# Patient Record
Sex: MALE | Race: OTHER | HISPANIC OR LATINO | Employment: FULL TIME | URBAN - METROPOLITAN AREA
[De-identification: names, ages, dates, MRNs, and addresses within clinical notes are randomized per-mention and may not be internally consistent; named-entity substitution may affect disease eponyms.]

---

## 2021-06-02 ENCOUNTER — OFFICE VISIT (OUTPATIENT)
Dept: FAMILY MEDICINE CLINIC | Facility: CLINIC | Age: 56
End: 2021-06-02
Payer: COMMERCIAL

## 2021-06-02 VITALS
BODY MASS INDEX: 27.16 KG/M2 | SYSTOLIC BLOOD PRESSURE: 122 MMHG | HEIGHT: 66 IN | RESPIRATION RATE: 20 BRPM | DIASTOLIC BLOOD PRESSURE: 72 MMHG | OXYGEN SATURATION: 99 % | WEIGHT: 169 LBS | HEART RATE: 68 BPM | TEMPERATURE: 97.8 F

## 2021-06-02 DIAGNOSIS — R53.83 OTHER FATIGUE: ICD-10-CM

## 2021-06-02 DIAGNOSIS — Z12.11 SCREENING FOR COLON CANCER: ICD-10-CM

## 2021-06-02 DIAGNOSIS — R73.9 HYPERGLYCEMIA: ICD-10-CM

## 2021-06-02 DIAGNOSIS — L72.3 SEBACEOUS CYST: Primary | ICD-10-CM

## 2021-06-02 DIAGNOSIS — E78.2 MIXED HYPERLIPIDEMIA: ICD-10-CM

## 2021-06-02 PROCEDURE — 99204 OFFICE O/P NEW MOD 45 MIN: CPT | Performed by: NURSE PRACTITIONER

## 2021-06-02 NOTE — PROGRESS NOTES
BMI Counseling: Body mass index is 27 28 kg/m²  The BMI is above normal  Nutrition recommendations include encouraging healthy choices of fruits and vegetables, decreasing fast food intake, consuming healthier snacks, limiting drinks that contain sugar, increasing intake of lean protein, reducing intake of saturated and trans fat and reducing intake of cholesterol  Exercise recommendations include exercising 3-5 times per week  Depression Screening and Follow-up Plan: Patient's depression screening was positive with a PHQ-2 score of 0  Clincally patient does not have depression  No treatment is required  Assessment/Plan:    Sebaceous cyst  -pt had this removed a few years ago and it has now returned  Does not hurt or bother him but he knows its there and growing  Today we are giving him a referral to see general surgery for possible removal        Diagnoses and all orders for this visit:    Sebaceous cyst  -     Ambulatory referral to General Surgery; Future    Screening for colon cancer  -     Occult Blood, Fecal Immunochemical; Future    Other fatigue  -     CBC and differential; Future  -     TSH, 3rd generation with Free T4 reflex; Future    Hyperglycemia  -     Comprehensive metabolic panel; Future  -     Hemoglobin A1C; Future    Mixed hyperlipidemia  -     Lipid panel; Future    Other orders  -     Cancel: HIV 1/2 Antigen/Antibody (4th Generation) w Reflex [facility]; Future  -     Cancel: Ambulatory referral to Gastroenterology; Future          Subjective:      Patient ID: Manny Diaz is a 55 y o  male  55year old male patient here to establish care  Was receiving care across the street  PMHx: no significant past medical history  States he had it removed a few years ago and this sebaceous cyst returned per patient  Denies any pain but knows its there  Rash  This is a recurrent problem  The current episode started more than 1 year ago  The problem has been gradually worsening since onset  The affected locations include the scalp (occipital)  The rash is characterized by swelling (lipoma like)  It is unknown if there was an exposure to a precipitant  Pertinent negatives include no congestion, cough, eye pain, fatigue, fever, rhinorrhea, shortness of breath, sore throat or vomiting  Past treatments include nothing  The treatment provided no relief  There is no history of allergies, asthma, eczema or varicella  The following portions of the patient's history were reviewed and updated as appropriate: allergies, current medications, past family history, past medical history, past social history, past surgical history and problem list     Review of Systems   Constitutional: Negative  Negative for appetite change, fatigue and fever  HENT: Negative  Negative for congestion, ear pain, postnasal drip, rhinorrhea, sore throat and voice change  Eyes: Negative  Negative for pain  Respiratory: Negative  Negative for cough, chest tightness, shortness of breath and wheezing  Cardiovascular: Negative  Negative for chest pain and palpitations  Gastrointestinal: Negative  Negative for abdominal distention and vomiting  Endocrine: Negative  Genitourinary: Negative  Negative for difficulty urinating  Musculoskeletal: Negative  Negative for arthralgias  Skin: Positive for rash  Negative for color change  Allergic/Immunologic: Negative  Neurological: Negative  Negative for dizziness and headaches  Hematological: Negative  Does not bruise/bleed easily  Psychiatric/Behavioral: Negative  Objective:      /72 (BP Location: Left arm, Patient Position: Sitting, Cuff Size: Standard)   Pulse 68   Temp 97 8 °F (36 6 °C) (Temporal)   Resp 20   Ht 5' 6" (1 676 m)   Wt 76 7 kg (169 lb)   SpO2 99%   BMI 27 28 kg/m²          Physical Exam  Vitals signs and nursing note reviewed  Constitutional:       General: He is not in acute distress       Appearance: Normal appearance  He is not ill-appearing  HENT:      Head: Normocephalic and atraumatic  Right Ear: External ear normal       Left Ear: External ear normal       Nose: Nose normal       Mouth/Throat:      Pharynx: Oropharynx is clear  No oropharyngeal exudate  Eyes:      Conjunctiva/sclera: Conjunctivae normal    Neck:      Musculoskeletal: Normal range of motion and neck supple  Cardiovascular:      Rate and Rhythm: Normal rate and regular rhythm  Pulses: Normal pulses  Heart sounds: Normal heart sounds  Pulmonary:      Effort: Pulmonary effort is normal  No respiratory distress  Breath sounds: Normal breath sounds  Musculoskeletal: Normal range of motion  Skin:     General: Skin is warm and dry  Capillary Refill: Capillary refill takes less than 2 seconds  Findings: Lesion and rash present  Rash is nodular  Comments: Cyst like lesion at occiput of scalp area  Non-tender to palpation  At site of previous lipoma  Neurological:      General: No focal deficit present  Mental Status: He is alert and oriented to person, place, and time     Psychiatric:         Mood and Affect: Mood normal          Behavior: Behavior normal              Chief Complaint   Patient presents with   [facility]

## 2021-06-02 NOTE — ASSESSMENT & PLAN NOTE
-pt had this removed a few years ago and it has now returned  Does not hurt or bother him but he knows its there and growing   Today we are giving him a referral to see general surgery for possible removal

## 2021-06-08 ENCOUNTER — APPOINTMENT (OUTPATIENT)
Dept: LAB | Facility: HOSPITAL | Age: 56
End: 2021-06-08
Payer: COMMERCIAL

## 2021-06-08 DIAGNOSIS — Z12.11 SCREENING FOR COLON CANCER: ICD-10-CM

## 2021-06-08 LAB — HEMOCCULT STL QL IA: NEGATIVE

## 2021-06-08 PROCEDURE — G0328 FECAL BLOOD SCRN IMMUNOASSAY: HCPCS

## 2021-07-07 ENCOUNTER — OFFICE VISIT (OUTPATIENT)
Dept: FAMILY MEDICINE CLINIC | Facility: CLINIC | Age: 56
End: 2021-07-07
Payer: COMMERCIAL

## 2021-07-07 ENCOUNTER — APPOINTMENT (OUTPATIENT)
Dept: LAB | Facility: HOSPITAL | Age: 56
End: 2021-07-07
Payer: COMMERCIAL

## 2021-07-07 VITALS
OXYGEN SATURATION: 99 % | RESPIRATION RATE: 20 BRPM | BODY MASS INDEX: 27.32 KG/M2 | WEIGHT: 170 LBS | HEART RATE: 76 BPM | SYSTOLIC BLOOD PRESSURE: 115 MMHG | TEMPERATURE: 97.3 F | HEIGHT: 66 IN | DIASTOLIC BLOOD PRESSURE: 70 MMHG

## 2021-07-07 DIAGNOSIS — Z12.5 SCREENING FOR PROSTATE CANCER: ICD-10-CM

## 2021-07-07 DIAGNOSIS — Z01.00 ROUTINE EYE EXAM: ICD-10-CM

## 2021-07-07 DIAGNOSIS — E78.2 MIXED HYPERLIPIDEMIA: ICD-10-CM

## 2021-07-07 DIAGNOSIS — Z00.00 ANNUAL PHYSICAL EXAM: Primary | ICD-10-CM

## 2021-07-07 DIAGNOSIS — R53.83 OTHER FATIGUE: ICD-10-CM

## 2021-07-07 DIAGNOSIS — R73.9 HYPERGLYCEMIA: ICD-10-CM

## 2021-07-07 DIAGNOSIS — L72.3 SEBACEOUS CYST: ICD-10-CM

## 2021-07-07 LAB
ALBUMIN SERPL BCP-MCNC: 3.8 G/DL (ref 3–5.2)
ALP SERPL-CCNC: 100 U/L (ref 43–122)
ALT SERPL W P-5'-P-CCNC: 31 U/L
ANION GAP SERPL CALCULATED.3IONS-SCNC: 8 MMOL/L (ref 5–14)
AST SERPL W P-5'-P-CCNC: 37 U/L (ref 17–59)
BILIRUB SERPL-MCNC: 0.59 MG/DL
BUN SERPL-MCNC: 13 MG/DL (ref 5–25)
CALCIUM SERPL-MCNC: 9 MG/DL (ref 8.4–10.2)
CHLORIDE SERPL-SCNC: 107 MMOL/L (ref 97–108)
CHOLEST SERPL-MCNC: 217 MG/DL
CO2 SERPL-SCNC: 25 MMOL/L (ref 22–30)
CREAT SERPL-MCNC: 0.78 MG/DL (ref 0.7–1.5)
EOSINOPHIL # BLD AUTO: 0.34 THOUSAND/UL (ref 0–0.4)
EOSINOPHIL NFR BLD MANUAL: 8 % (ref 0–6)
ERYTHROCYTE [DISTWIDTH] IN BLOOD BY AUTOMATED COUNT: 13.8 %
EST. AVERAGE GLUCOSE BLD GHB EST-MCNC: 120 MG/DL
GFR SERPL CREATININE-BSD FRML MDRD: 102 ML/MIN/1.73SQ M
GLUCOSE P FAST SERPL-MCNC: 99 MG/DL (ref 70–99)
HBA1C MFR BLD: 5.8 %
HCT VFR BLD AUTO: 40.9 % (ref 41–53)
HDLC SERPL-MCNC: 46 MG/DL
HGB BLD-MCNC: 13.5 G/DL (ref 13.5–17.5)
LDLC SERPL CALC-MCNC: 148 MG/DL
LYMPHOCYTES # BLD AUTO: 1.76 THOUSAND/UL (ref 0.5–4)
LYMPHOCYTES # BLD AUTO: 41 % (ref 25–45)
MCH RBC QN AUTO: 29.6 PG (ref 26–34)
MCHC RBC AUTO-ENTMCNC: 32.9 G/DL (ref 31–36)
MCV RBC AUTO: 90 FL (ref 80–100)
MONOCYTES # BLD AUTO: 0.43 THOUSAND/UL (ref 0.2–0.9)
MONOCYTES NFR BLD AUTO: 10 % (ref 1–10)
NEUTS SEG # BLD: 1.76 THOUSAND/UL (ref 1.8–7.8)
NEUTS SEG NFR BLD AUTO: 41 %
NONHDLC SERPL-MCNC: 171 MG/DL
PLATELET # BLD AUTO: 299 THOUSANDS/UL (ref 150–450)
PLATELET BLD QL SMEAR: ADEQUATE
PMV BLD AUTO: 8 FL (ref 8.9–12.7)
POTASSIUM SERPL-SCNC: 4 MMOL/L (ref 3.6–5)
PROT SERPL-MCNC: 6.8 G/DL (ref 5.9–8.4)
RBC # BLD AUTO: 4.54 MILLION/UL (ref 4.5–5.9)
RBC MORPH BLD: NORMAL
SODIUM SERPL-SCNC: 140 MMOL/L (ref 137–147)
TOTAL CELLS COUNTED SPEC: 100
TRIGL SERPL-MCNC: 116 MG/DL
TSH SERPL DL<=0.05 MIU/L-ACNC: 1.25 UIU/ML (ref 0.47–4.68)
WBC # BLD AUTO: 4.3 THOUSAND/UL (ref 4.5–11)

## 2021-07-07 PROCEDURE — 36415 COLL VENOUS BLD VENIPUNCTURE: CPT

## 2021-07-07 PROCEDURE — 85007 BL SMEAR W/DIFF WBC COUNT: CPT

## 2021-07-07 PROCEDURE — 80061 LIPID PANEL: CPT

## 2021-07-07 PROCEDURE — 84443 ASSAY THYROID STIM HORMONE: CPT

## 2021-07-07 PROCEDURE — 99396 PREV VISIT EST AGE 40-64: CPT | Performed by: NURSE PRACTITIONER

## 2021-07-07 PROCEDURE — 80053 COMPREHEN METABOLIC PANEL: CPT

## 2021-07-07 PROCEDURE — 83036 HEMOGLOBIN GLYCOSYLATED A1C: CPT

## 2021-07-07 PROCEDURE — 85027 COMPLETE CBC AUTOMATED: CPT

## 2021-07-07 NOTE — PROGRESS NOTES
[address] PRIMARY CARE [facility]    NAME: Mohsen Cota  AGE: 55 y o  SEX: male  : 1965     DATE: 2021     Assessment and Plan:     Problem List Items Addressed This Visit        Musculoskeletal and Integument    Sebaceous cyst    Relevant Orders    Ambulatory referral to General Surgery       Other    Annual physical exam - Primary     Patient recommended healthy eating habits  Health risk assessment was discussed with patient also and the ways to stay healthier  Recommended a exercising frequently at least 5 days a week for 30 minutes at a time; such as joining a gym if not already enrolled or walking  Eating low-fat and low-cholesterol foods with avoidance of saturated fats or fried foods  Immunizations, and the need to comply with current CDC's recommendations were discussed  To follow up yearly for physical exams  Routine eye exam    Relevant Orders    Ambulatory referral to Ophthalmology    Screening for prostate cancer    Relevant Orders    PSA, Total Screen          Immunizations and preventive care screenings were discussed with patient today  Appropriate education was printed on patient's after visit summary  Counseling:  Alcohol/drug use: discussed moderation in alcohol intake, the recommendations for healthy alcohol use, and avoidance of illicit drug use  Dental Health: discussed importance of regular tooth brushing, flossing, and dental visits  Injury prevention: discussed safety/seat belts, safety helmets, smoke detectors, carbon dioxide detectors, and smoking near bedding or upholstery  Sexual health: discussed sexually transmitted diseases, partner selection, use of condoms, avoidance of unintended pregnancy, and contraceptive alternatives  · Exercise: the importance of regular exercise/physical activity was discussed  Recommend exercise 3-5 times per week for at least 30 minutes       BMI Counseling: Body mass index is 27 44 kg/m²  The BMI is above normal  Nutrition recommendations include encouraging healthy choices of fruits and vegetables, decreasing fast food intake, consuming healthier snacks, limiting drinks that contain sugar, increasing intake of lean protein, reducing intake of saturated and trans fat and reducing intake of cholesterol  Exercise recommendations include exercising 3-5 times per week  Depression Screening and Follow-up Plan: Patient's depression screening was positive with a PHQ-2 score of 0  Clincally patient does not have depression  No treatment is required  Return in about 1 year (around 7/7/2022) for Annual physical      Chief Complaint:     Chief Complaint   Patient presents with    Physical Exam      History of Present Illness:     Adult Annual Physical   Patient here for a comprehensive physical exam  The patient reports no problems  Diet and Physical Activity  · Diet/Nutrition: limited junk food, limited fruits/vegetables and at times bread and carbs  · Exercise: walking  Depression Screening  PHQ-9 Depression Screening    PHQ-9:   Frequency of the following problems over the past two weeks:      Little interest or pleasure in doing things: 0 - not at all  Feeling down, depressed, or hopeless: 0 - not at all  PHQ-2 Score: 0       General Health  · Sleep: gets 7-8 hours of sleep on average  · Hearing: normal - bilateral   · Vision: most recent eye exam >1 year ago  · Dental: regular dental visits and brushes teeth once daily   Health  · Symptoms include: none     Review of Systems:     Review of Systems   Constitutional: Negative  Negative for appetite change, fatigue and fever  HENT: Negative  Negative for congestion, ear pain, postnasal drip, rhinorrhea, sore throat and voice change  Eyes: Negative  Respiratory: Negative  Negative for cough, chest tightness, shortness of breath and wheezing  Cardiovascular: Negative  Negative for chest pain and palpitations  Gastrointestinal: Negative  Negative for abdominal distention  Endocrine: Negative  Genitourinary: Negative  Negative for difficulty urinating  Musculoskeletal: Negative  Negative for arthralgias  Skin: Negative  Negative for color change and rash  Allergic/Immunologic: Negative  Neurological: Negative  Negative for dizziness and headaches  Hematological: Negative  Does not bruise/bleed easily  Psychiatric/Behavioral: Negative  Past Medical History:     History reviewed  No pertinent past medical history  Past Surgical History:     Past Surgical History:   Procedure Laterality Date    CYST REMOVAL        Family History:     Family History   Problem Relation Age of Onset    Diabetes Father       Social History:     Social History     Socioeconomic History    Marital status: /Civil Union     Spouse name: None    Number of children: None    Years of education: None    Highest education level: None   Occupational History    None   Tobacco Use    Smoking status: Never Smoker    Smokeless tobacco: Never Used   Vaping Use    Vaping Use: Never used   Substance and Sexual Activity    Alcohol use: Not Currently    Drug use: Never    Sexual activity: Yes     Partners: Female     Birth control/protection: None   Other Topics Concern    None   Social History Narrative    None     Social Determinants of Health     Financial Resource Strain:     Difficulty of Paying Living Expenses:    Food Insecurity:     Worried About Running Out of Food in the Last Year:     Ran Out of Food in the Last Year:    Transportation Needs:     Lack of Transportation (Medical):      Lack of Transportation (Non-Medical):    Physical Activity:     Days of Exercise per Week:     Minutes of Exercise per Session:    Stress:     Feeling of Stress :    Social Connections:     Frequency of Communication with Friends and Family:     Frequency of Social Gatherings with Friends and Family:     Attends Zoroastrianism Services:     Active Member of Clubs or Organizations:     Attends Club or Organization Meetings:     Marital Status:    Intimate Partner Violence:     Fear of Current or Ex-Partner:     Emotionally Abused:     Physically Abused:     Sexually Abused:       Current Medications:     No current outpatient medications on file  No current facility-administered medications for this visit  Allergies:     No Known Allergies   Physical Exam:     /70 (BP Location: Left arm, Patient Position: Sitting, Cuff Size: Standard)   Pulse 76   Temp (!) 97 3 °F (36 3 °C) (Temporal)   Resp 20   Ht 5' 6" (1 676 m)   Wt 77 1 kg (170 lb)   SpO2 99%   BMI 27 44 kg/m²     Physical Exam  Vitals and nursing note reviewed  Constitutional:       General: He is not in acute distress  Appearance: Normal appearance  He is not ill-appearing, toxic-appearing or diaphoretic  HENT:      Head: Normocephalic and atraumatic  Right Ear: Tympanic membrane, ear canal and external ear normal  There is no impacted cerumen  Left Ear: Tympanic membrane, ear canal and external ear normal  There is no impacted cerumen  Nose: Nose normal  No congestion or rhinorrhea  Mouth/Throat:      Mouth: Mucous membranes are moist       Pharynx: Oropharynx is clear  No oropharyngeal exudate or posterior oropharyngeal erythema  Eyes:      Extraocular Movements: Extraocular movements intact  Conjunctiva/sclera: Conjunctivae normal       Pupils: Pupils are equal, round, and reactive to light  Cardiovascular:      Rate and Rhythm: Normal rate and regular rhythm  Pulses: Normal pulses  Heart sounds: Normal heart sounds  Pulmonary:      Effort: Pulmonary effort is normal       Breath sounds: Normal breath sounds  Abdominal:      General: Bowel sounds are normal       Palpations: Abdomen is soft     Genitourinary:     Comments: Pt deferred  Musculoskeletal:         General: No tenderness or deformity  Normal range of motion  Cervical back: Normal range of motion and neck supple  Skin:     General: Skin is warm and dry  Capillary Refill: Capillary refill takes less than 2 seconds  Neurological:      General: No focal deficit present  Mental Status: He is alert and oriented to person, place, and time  Psychiatric:         Mood and Affect: Mood normal          Behavior: Behavior normal          Thought Content:  Thought content normal          Judgment: Judgment normal           Caryle Brave, CRNP  [address]

## 2021-07-07 NOTE — PATIENT INSTRUCTIONS

## 2021-07-23 ENCOUNTER — TELEPHONE (OUTPATIENT)
Dept: FAMILY MEDICINE CLINIC | Facility: CLINIC | Age: 56
End: 2021-07-23

## 2021-08-03 ENCOUNTER — ANESTHESIA EVENT (OUTPATIENT)
Dept: PERIOP | Facility: HOSPITAL | Age: 56
End: 2021-08-03
Payer: COMMERCIAL

## 2021-08-04 ENCOUNTER — HOSPITAL ENCOUNTER (OUTPATIENT)
Facility: HOSPITAL | Age: 56
Setting detail: OUTPATIENT SURGERY
Discharge: HOME | End: 2021-08-04
Attending: SURGERY | Admitting: SURGERY
Payer: COMMERCIAL

## 2021-08-04 ENCOUNTER — ANESTHESIA (OUTPATIENT)
Dept: PERIOP | Facility: HOSPITAL | Age: 56
End: 2021-08-04
Payer: COMMERCIAL

## 2021-08-04 VITALS
HEIGHT: 66 IN | TEMPERATURE: 96.8 F | HEART RATE: 55 BPM | SYSTOLIC BLOOD PRESSURE: 89 MMHG | BODY MASS INDEX: 27.32 KG/M2 | DIASTOLIC BLOOD PRESSURE: 50 MMHG | WEIGHT: 170 LBS | OXYGEN SATURATION: 99 % | RESPIRATION RATE: 16 BRPM

## 2021-08-04 DIAGNOSIS — R22.2 LOCALIZED SWELLING, MASS AND LUMP, TRUNK: ICD-10-CM

## 2021-08-04 DIAGNOSIS — R22.1 LOCALIZED SWELLING, MASS AND LUMP, NECK: ICD-10-CM

## 2021-08-04 PROCEDURE — 88304 TISSUE EXAM BY PATHOLOGIST: CPT | Performed by: PATHOLOGY

## 2021-08-04 RX ORDER — LIDOCAINE HYDROCHLORIDE 10 MG/ML
INJECTION, SOLUTION EPIDURAL; INFILTRATION; INTRACAUDAL; PERINEURAL AS NEEDED
Status: DISCONTINUED | OUTPATIENT
Start: 2021-08-04 | End: 2021-08-04 | Stop reason: HOSPADM

## 2021-08-04 RX ORDER — PROPOFOL 10 MG/ML
INJECTION, EMULSION INTRAVENOUS CONTINUOUS PRN
Status: DISCONTINUED | OUTPATIENT
Start: 2021-08-04 | End: 2021-08-04

## 2021-08-04 RX ORDER — MAGNESIUM HYDROXIDE 1200 MG/15ML
LIQUID ORAL AS NEEDED
Status: DISCONTINUED | OUTPATIENT
Start: 2021-08-04 | End: 2021-08-04 | Stop reason: HOSPADM

## 2021-08-04 RX ORDER — CEFAZOLIN SODIUM 2 G/50ML
2000 SOLUTION INTRAVENOUS ONCE
Status: COMPLETED | OUTPATIENT
Start: 2021-08-04 | End: 2021-08-04

## 2021-08-04 RX ORDER — EPHEDRINE SULFATE 50 MG/ML
INJECTION INTRAVENOUS AS NEEDED
Status: DISCONTINUED | OUTPATIENT
Start: 2021-08-04 | End: 2021-08-04

## 2021-08-04 RX ORDER — FENTANYL CITRATE 50 UG/ML
INJECTION, SOLUTION INTRAMUSCULAR; INTRAVENOUS AS NEEDED
Status: DISCONTINUED | OUTPATIENT
Start: 2021-08-04 | End: 2021-08-04

## 2021-08-04 RX ORDER — ONDANSETRON 2 MG/ML
4 INJECTION INTRAMUSCULAR; INTRAVENOUS ONCE AS NEEDED
Status: DISCONTINUED | OUTPATIENT
Start: 2021-08-04 | End: 2021-08-04 | Stop reason: HOSPADM

## 2021-08-04 RX ORDER — ACETAMINOPHEN 325 MG/1
650 TABLET ORAL EVERY 6 HOURS PRN
Status: DISCONTINUED | OUTPATIENT
Start: 2021-08-04 | End: 2021-08-04 | Stop reason: HOSPADM

## 2021-08-04 RX ORDER — FENTANYL CITRATE/PF 50 MCG/ML
50 SYRINGE (ML) INJECTION
Status: DISCONTINUED | OUTPATIENT
Start: 2021-08-04 | End: 2021-08-04 | Stop reason: HOSPADM

## 2021-08-04 RX ORDER — LIDOCAINE HYDROCHLORIDE 10 MG/ML
INJECTION, SOLUTION EPIDURAL; INFILTRATION; INTRACAUDAL; PERINEURAL AS NEEDED
Status: DISCONTINUED | OUTPATIENT
Start: 2021-08-04 | End: 2021-08-04

## 2021-08-04 RX ORDER — PROPOFOL 10 MG/ML
INJECTION, EMULSION INTRAVENOUS AS NEEDED
Status: DISCONTINUED | OUTPATIENT
Start: 2021-08-04 | End: 2021-08-04

## 2021-08-04 RX ORDER — PROMETHAZINE HYDROCHLORIDE 25 MG/ML
12.5 INJECTION, SOLUTION INTRAMUSCULAR; INTRAVENOUS
Status: DISCONTINUED | OUTPATIENT
Start: 2021-08-04 | End: 2021-08-04 | Stop reason: HOSPADM

## 2021-08-04 RX ORDER — OXYCODONE HYDROCHLORIDE AND ACETAMINOPHEN 5; 325 MG/1; MG/1
1 TABLET ORAL EVERY 4 HOURS PRN
Status: DISCONTINUED | OUTPATIENT
Start: 2021-08-04 | End: 2021-08-04 | Stop reason: HOSPADM

## 2021-08-04 RX ORDER — SODIUM CHLORIDE, SODIUM LACTATE, POTASSIUM CHLORIDE, CALCIUM CHLORIDE 600; 310; 30; 20 MG/100ML; MG/100ML; MG/100ML; MG/100ML
100 INJECTION, SOLUTION INTRAVENOUS CONTINUOUS
Status: DISCONTINUED | OUTPATIENT
Start: 2021-08-04 | End: 2021-08-04 | Stop reason: HOSPADM

## 2021-08-04 RX ORDER — MIDAZOLAM HYDROCHLORIDE 2 MG/2ML
INJECTION, SOLUTION INTRAMUSCULAR; INTRAVENOUS AS NEEDED
Status: DISCONTINUED | OUTPATIENT
Start: 2021-08-04 | End: 2021-08-04

## 2021-08-04 RX ORDER — HYDROMORPHONE HCL/PF 1 MG/ML
0.25 SYRINGE (ML) INJECTION
Status: DISCONTINUED | OUTPATIENT
Start: 2021-08-04 | End: 2021-08-04 | Stop reason: HOSPADM

## 2021-08-04 RX ORDER — KETOROLAC TROMETHAMINE 30 MG/ML
INJECTION, SOLUTION INTRAMUSCULAR; INTRAVENOUS AS NEEDED
Status: DISCONTINUED | OUTPATIENT
Start: 2021-08-04 | End: 2021-08-04

## 2021-08-04 RX ORDER — ONDANSETRON 2 MG/ML
INJECTION INTRAMUSCULAR; INTRAVENOUS AS NEEDED
Status: DISCONTINUED | OUTPATIENT
Start: 2021-08-04 | End: 2021-08-04

## 2021-08-04 RX ADMIN — FENTANYL CITRATE 50 MCG: 50 INJECTION, SOLUTION INTRAMUSCULAR; INTRAVENOUS at 09:23

## 2021-08-04 RX ADMIN — MIDAZOLAM 2 MG: 1 INJECTION INTRAMUSCULAR; INTRAVENOUS at 09:23

## 2021-08-04 RX ADMIN — FENTANYL CITRATE 50 MCG: 50 INJECTION, SOLUTION INTRAMUSCULAR; INTRAVENOUS at 09:33

## 2021-08-04 RX ADMIN — CEFAZOLIN SODIUM 2000 MG: 2 SOLUTION INTRAVENOUS at 09:14

## 2021-08-04 RX ADMIN — LIDOCAINE HYDROCHLORIDE 50 MG: 10 INJECTION, SOLUTION EPIDURAL; INFILTRATION; INTRACAUDAL; PERINEURAL at 09:25

## 2021-08-04 RX ADMIN — PROPOFOL 50 MG: 10 INJECTION, EMULSION INTRAVENOUS at 09:25

## 2021-08-04 RX ADMIN — SODIUM CHLORIDE, SODIUM LACTATE, POTASSIUM CHLORIDE, AND CALCIUM CHLORIDE: .6; .31; .03; .02 INJECTION, SOLUTION INTRAVENOUS at 09:18

## 2021-08-04 RX ADMIN — PROPOFOL 120 MCG/KG/MIN: 10 INJECTION, EMULSION INTRAVENOUS at 09:25

## 2021-08-04 RX ADMIN — ONDANSETRON 4 MG: 2 INJECTION INTRAMUSCULAR; INTRAVENOUS at 09:31

## 2021-08-04 RX ADMIN — KETOROLAC TROMETHAMINE 30 MG: 30 INJECTION, SOLUTION INTRAMUSCULAR at 10:17

## 2021-08-04 RX ADMIN — EPHEDRINE SULFATE 10 MG: 50 INJECTION, SOLUTION INTRAVENOUS at 10:04

## 2021-08-04 NOTE — INTERVAL H&P NOTE
H&P reviewed  After examining the patient I find no changes in the patients condition since the H&P had been written      Vitals:    08/04/21 0701   BP: 122/80   Pulse: 56   Resp: 18   Temp: 97 6 °F (36 4 °C)   SpO2: 100%

## 2021-08-04 NOTE — ANESTHESIA PREPROCEDURE EVALUATION
Procedure:  EXCISION OF ENLARGING NECK & BACK MASSES (N/A Neck)    Relevant Problems   ANESTHESIA (within normal limits)      CARDIO   (+) Mixed hyperlipidemia        Physical Exam    Airway    Mallampati score: III  TM Distance: >3 FB  Neck ROM: full     Dental   No notable dental hx     Cardiovascular  Rate: normal,     Pulmonary  Pulmonary exam normal     Other Findings        Anesthesia Plan  ASA Score- 2     Anesthesia Type- IV sedation with anesthesia with ASA Monitors  Additional Monitors:   Airway Plan:     Comment: Per patient, appropriately NPO, denies active CP/SOB/wheezing/symptoms related to heartburn/nausea/vomiting  Plan Factors-Exercise tolerance (METS): >4 METS  Chart reviewed  Patient summary reviewed  Patient is not a current smoker  Induction- intravenous  Postoperative Plan- Plan for postoperative opioid use  Informed Consent- Anesthetic plan and risks discussed with patient  I personally reviewed this patient with the CRNA  Discussed and agreed on the Anesthesia Plan with the CRNA  Inge Mcgarry

## 2021-08-04 NOTE — ANESTHESIA POSTPROCEDURE EVALUATION
Post-Op Assessment Note    CV Status:  Stable  Pain Score: 0    Pain management: adequate     Mental Status:  Alert and awake   Hydration Status:  Euvolemic   PONV Controlled:  Controlled   Airway Patency:  Patent      Post Op Vitals Reviewed: Yes      Staff: CRNA         No complications documented      /57 (08/04/21 1027)    Temp 97 5 °F (36 4 °C) (08/04/21 1027)    Pulse 72 (08/04/21 1027)   Resp 16 (08/04/21 1027)    SpO2 100 % (08/04/21 1027)

## 2021-08-04 NOTE — DISCHARGE INSTRUCTIONS
No lifting, no driving, may shower on 08/06/2021    Apply ice to the incisions while awake  Take Tylenol and Motrin for pain  Take 1 Percocet every 4 hours for severe pain  See Dr Fani Rios in 1 week  Call [phone] for a time

## 2021-08-04 NOTE — OP NOTE
OPERATIVE REPORT  PATIENT NAME: Christen Li    :  1965  MRN: [id number]  Pt Location:  OR ROOM 12    SURGERY DATE: 2021    Surgeon(s) and Role:     Samir Foley,  - Primary    Preop Diagnosis:  Localized swelling, mass and lump, neck [R22 1]  Localized swelling, mass and lump, trunk [R22 2]    Post-Op Diagnosis Codes:     * Localized swelling, mass and lump, neck [R22 1]     * Localized swelling, mass and lump, trunk [R22 2]    Procedure(s) (LRB):  EXCISION OF ENLARGING NECK & BACK MASSES (N/A)    Specimen(s):  ID Type Source Tests Collected by Time Destination   1 : right posterior neck mass Tissue Neck TISSUE EXAM Elliott Morgan,  2021 [id number]    2 : intramuscular lipoma of left back Tissue Soft Tissue, Lipoma TISSUE EXAM Elliott Morgan,  2021 1002        Estimated Blood Loss:   Minimal    Drains:  * No LDAs found *    Anesthesia Type:   Choice    Operative Indications:  Localized swelling, mass and lump, neck [R22 1]  Localized swelling, mass and lump, trunk [R22 2]  Enlarging right posterior neck and left back lesions  Operative Findings:  2 cm epidermal inclusion cyst right neck  3 cm intramuscular lipoma left back    Complications:   None    Procedure and Technique: With the patient in the left lateral position the neck and back were prepped in the usual manner  The lesion on the posterior right neck was anesthetized and excised with a transverse elliptical incision  The previously made incision from several years ago was incorporated in the specimen  Lesion was saved and sent to pathology lab for tissue diagnosis  The skin was closed with interrupted vertical mattress sutures of 3-0 nylon  The left lateral back lesion was anesthetized 1% xylocaine and epinephrine  A linear incision was made directly over the mass  Dissection carried down through the subcutaneous tissue  No lipoma was encountered in the subcutaneous tissue    Therefore the fascia was incised the muscle was split  A 3 cm diameter lipomatous mass was excised saved and sent to pathology for tissue diagnosis  The mass was well encapsulated  The muscle was then approximated with a running suture of 3-0 Vicryl  And the skin was closed carefully with interrupted vertical mattress sutures of 3-0 nylon  Patient tolerated procedure well  All sponge counts were correct  Hemostasis was adequate and he was taken to PACU in stable condition     I was present for the entire procedure    Patient Disposition:  PACU     SIGNATURE: Autry Jeans,   DATE: August 4, 2021  TIME: 10:28 AM

## 2021-08-04 NOTE — H&P
H&P Exam - General Surgery   Martha Stanford 55 y o  male MRN: [id number]  Unit/Bed#: ELISA GEE Encounter: [id number]    Assessment/Plan     Assessment:  Enlarging right neck and left back masses  Plan:  Excised    History of Present Illness   History, ROS and PFSH unobtainable from any source due to none  HPI:  Martha Stanford is a 55 y o  male who presents with enlarging right neck and left back masses       Review of Systems   All other systems reviewed and are negative  Historical Information   History reviewed  No pertinent past medical history  Past Surgical History:   Procedure Laterality Date    CYST REMOVAL       Social History   Social History     Substance and Sexual Activity   Alcohol Use Not Currently     Social History     Substance and Sexual Activity   Drug Use Never     Social History     Tobacco Use   Smoking Status Never Smoker   Smokeless Tobacco Never Used     E-Cigarette/Vaping    E-Cigarette Use Never User      E-Cigarette/Vaping Substances     Family History: non-contributory    Meds/Allergies   all medications and allergies reviewed  No Known Allergies    Objective   First Vitals:   Blood Pressure: 122/80 (08/04/21 0701)  Pulse: 56 (08/04/21 0701)  Temperature: 97 6 °F (36 4 °C) (08/04/21 0701)  Temp Source: Temporal (08/04/21 0701)  Respirations: 18 (08/04/21 0701)  Height: 5' 6" (167 6 cm) (08/04/21 0700)  Weight - Scale: 77 1 kg (170 lb) (08/04/21 0700)  SpO2: 100 % (08/04/21 0701)    Current Vitals:   Blood Pressure: 122/80 (08/04/21 0701)  Pulse: 56 (08/04/21 0701)  Temperature: 97 6 °F (36 4 °C) (08/04/21 0701)  Temp Source: Temporal (08/04/21 0701)  Respirations: 18 (08/04/21 0701)  Height: 5' 6" (167 6 cm) (08/04/21 0700)  Weight - Scale: 77 1 kg (170 lb) (08/04/21 0700)  SpO2: 100 % (08/04/21 0701)    No intake or output data in the 24 hours ending 08/04/21 0751    Invasive Devices     None                 Physical Exam  Vitals reviewed     Constitutional: Appearance: Normal appearance  Neck:      Comments: 2 cm round right posterior neck mass  No erythema no drainage  Scar is present from previous excision  Cardiovascular:      Heart sounds: Normal heart sounds  Pulmonary:      Breath sounds: Normal breath sounds  Abdominal:      Palpations: Abdomen is soft  Tenderness: There is no abdominal tenderness  Musculoskeletal:         General: Normal range of motion  Comments: Soft tissue mass 3 cm in size overlying the tip of the left scapula   Skin:     General: Skin is warm and dry  Neurological:      General: No focal deficit present  Mental Status: He is alert and oriented to person, place, and time  Psychiatric:         Mood and Affect: Mood normal          Behavior: Behavior normal          Thought Content: Thought content normal          Judgment: Judgment normal          Lab Results: I have personally reviewed pertinent lab results  Imaging: I have personally reviewed pertinent reports  EKG, Pathology, and Other Studies: I have personally reviewed pertinent reports  Code Status: No Order  Advance Directive and Living Will:      Power of :    POLST:      Counseling / Coordination of Care  Total floor / unit time spent today 20 minutes  Greater than 50% of total time was spent with the patient and / or family counseling and / or coordination of care  A description of the counseling / coordination of care:  Excise recurrent right neck mass and left back mass  Pankaj Muñoz

## 2022-01-25 ENCOUNTER — APPOINTMENT (OUTPATIENT)
Dept: LAB | Facility: HOSPITAL | Age: 57
End: 2022-01-25
Payer: COMMERCIAL

## 2022-01-25 ENCOUNTER — OFFICE VISIT (OUTPATIENT)
Dept: FAMILY MEDICINE CLINIC | Facility: CLINIC | Age: 57
End: 2022-01-25
Payer: COMMERCIAL

## 2022-01-25 VITALS
TEMPERATURE: 98 F | WEIGHT: 161 LBS | DIASTOLIC BLOOD PRESSURE: 60 MMHG | RESPIRATION RATE: 18 BRPM | BODY MASS INDEX: 25.88 KG/M2 | OXYGEN SATURATION: 99 % | SYSTOLIC BLOOD PRESSURE: 102 MMHG | HEART RATE: 97 BPM | HEIGHT: 66 IN

## 2022-01-25 DIAGNOSIS — E78.2 MIXED HYPERLIPIDEMIA: ICD-10-CM

## 2022-01-25 DIAGNOSIS — R73.03 PREDIABETES: Primary | ICD-10-CM

## 2022-01-25 DIAGNOSIS — Z12.5 SCREENING FOR PROSTATE CANCER: ICD-10-CM

## 2022-01-25 DIAGNOSIS — R73.03 PREDIABETES: ICD-10-CM

## 2022-01-25 LAB
CHOLEST SERPL-MCNC: 204 MG/DL
EST. AVERAGE GLUCOSE BLD GHB EST-MCNC: 120 MG/DL
HBA1C MFR BLD: 5.8 %
HDLC SERPL-MCNC: 53 MG/DL
LDLC SERPL CALC-MCNC: 140 MG/DL
NONHDLC SERPL-MCNC: 151 MG/DL
TRIGL SERPL-MCNC: 55 MG/DL

## 2022-01-25 PROCEDURE — 84153 ASSAY OF PSA TOTAL: CPT

## 2022-01-25 PROCEDURE — 83036 HEMOGLOBIN GLYCOSYLATED A1C: CPT

## 2022-01-25 PROCEDURE — 99214 OFFICE O/P EST MOD 30 MIN: CPT | Performed by: NURSE PRACTITIONER

## 2022-01-25 PROCEDURE — 80061 LIPID PANEL: CPT

## 2022-01-25 PROCEDURE — 36415 COLL VENOUS BLD VENIPUNCTURE: CPT

## 2022-01-25 PROCEDURE — 84154 ASSAY OF PSA FREE: CPT

## 2022-01-25 NOTE — PROGRESS NOTES
BMI Counseling: Body mass index is 25 99 kg/m²  The BMI is above normal  Nutrition recommendations include encouraging healthy choices of fruits and vegetables, decreasing fast food intake, consuming healthier snacks, limiting drinks that contain sugar, increasing intake of lean protein, reducing intake of saturated and trans fat and reducing intake of cholesterol  Exercise recommendations include exercising 3-5 times per week  Rationale for BMI follow-up plan is due to patient being overweight or obese  Depression Screening and Follow-up Plan: Patient was screened for depression during today's encounter  They screened negative with a PHQ-2 score of 0  Assessment/Plan:    Prediabetes  -pt here for follow up on his pre-diabetes  He has modified his diet and lost weight  Pt states he is eating healthier now and would like to have his A1c re-checked  Will re-order A1c  Mixed hyperlipidemia  Recommend  lifestyle modifications like eating a heart healthy diet, regular exercises, and maintaining a  desirable body weight  Patient should increase fatty fish: (salmon, tuna, mackerel, anchovies), fiber, berries, apples, pears, peaches, leafy greens, broccoli, carrots, green beans, cucumbers  Advise on reduced added sugars, increase omega-3, eliminate trans fat and saturated fat  Also recommend to avoid fried and fatty foods, processed meals/snacks, sugary drinks and limit sodium intake  Diagnoses and all orders for this visit:    Prediabetes  -     Hemoglobin A1C; Future    Mixed hyperlipidemia  -     Lipid panel; Future    Screening for prostate cancer  -     PSA, total and free; Future          Subjective:      Patient ID: Arya Alegria is a 65 y o  male  65year old male patient here for follow up on prediabetes and high cholesterol He is eating more healthy and smaller portions  States he works a very tough job but does not exercise and is in motion all day        The following portions of the patient's history were reviewed and updated as appropriate: allergies, current medications, past family history, past medical history, past social history, past surgical history and problem list     Review of Systems   Constitutional: Positive for unexpected weight change  Negative for appetite change, fatigue and fever  HENT: Negative  Negative for congestion, ear pain, postnasal drip, rhinorrhea, sore throat and voice change  Eyes: Negative  Respiratory: Negative  Negative for cough, chest tightness, shortness of breath and wheezing  Cardiovascular: Negative  Negative for chest pain and palpitations  Gastrointestinal: Negative  Negative for abdominal distention  Endocrine: Negative  Genitourinary: Negative  Negative for difficulty urinating  Musculoskeletal: Negative  Negative for arthralgias  Skin: Negative  Negative for color change and rash  Allergic/Immunologic: Negative  Neurological: Negative  Negative for dizziness and headaches  Hematological: Negative  Does not bruise/bleed easily  Psychiatric/Behavioral: Negative  Objective:      /60 (BP Location: Right arm, Patient Position: Sitting, Cuff Size: Standard)   Pulse 97   Temp 98 °F (36 7 °C) (Temporal)   Resp 18   Ht 5' 6" (1 676 m)   Wt 73 kg (161 lb)   SpO2 99%   BMI 25 99 kg/m²          Physical Exam  Vitals and nursing note reviewed  Constitutional:       General: He is not in acute distress  Appearance: Normal appearance  He is not ill-appearing  HENT:      Head: Normocephalic and atraumatic  Right Ear: External ear normal       Left Ear: External ear normal       Nose: Nose normal  No congestion or rhinorrhea  Mouth/Throat:      Pharynx: Oropharynx is clear  No oropharyngeal exudate  Eyes:      Conjunctiva/sclera: Conjunctivae normal    Cardiovascular:      Rate and Rhythm: Normal rate and regular rhythm  Pulses: Normal pulses        Heart sounds: Normal heart sounds  Pulmonary:      Effort: Pulmonary effort is normal  No respiratory distress  Breath sounds: Normal breath sounds  Musculoskeletal:         General: Normal range of motion  Cervical back: Normal range of motion and neck supple  Skin:     General: Skin is warm and dry  Capillary Refill: Capillary refill takes less than 2 seconds  Neurological:      General: No focal deficit present  Mental Status: He is alert and oriented to person, place, and time     Psychiatric:         Mood and Affect: Mood normal          Behavior: Behavior normal              Chief Complaint   Patient presents with    Follow-up

## 2022-01-25 NOTE — ASSESSMENT & PLAN NOTE
-pt here for follow up on his pre-diabetes  He has modified his diet and lost weight  Pt states he is eating healthier now and would like to have his A1c re-checked  Will re-order A1c

## 2022-01-25 NOTE — ASSESSMENT & PLAN NOTE
Recommend  lifestyle modifications like eating a heart healthy diet, regular exercises, and maintaining a  desirable body weight  Patient should increase fatty fish: (salmon, tuna, mackerel, anchovies), fiber, berries, apples, pears, peaches, leafy greens, broccoli, carrots, green beans, cucumbers  Advise on reduced added sugars, increase omega-3, eliminate trans fat and saturated fat  Also recommend to avoid fried and fatty foods, processed meals/snacks, sugary drinks and limit sodium intake

## 2022-01-26 LAB
PSA FREE MFR SERPL: 12.5 %
PSA FREE SERPL-MCNC: 0.1 NG/ML
PSA SERPL-MCNC: 0.8 NG/ML (ref 0–4)

## 2022-10-12 PROBLEM — Z12.5 SCREENING FOR PROSTATE CANCER: Status: RESOLVED | Noted: 2021-07-07 | Resolved: 2022-10-12

## 2022-10-12 PROBLEM — Z12.11 SCREENING FOR COLON CANCER: Status: RESOLVED | Noted: 2021-06-02 | Resolved: 2022-10-12

## 2022-12-08 ENCOUNTER — APPOINTMENT (OUTPATIENT)
Dept: LAB | Facility: HOSPITAL | Age: 57
End: 2022-12-08

## 2022-12-08 ENCOUNTER — OFFICE VISIT (OUTPATIENT)
Dept: FAMILY MEDICINE CLINIC | Facility: CLINIC | Age: 57
End: 2022-12-08

## 2022-12-08 VITALS
HEIGHT: 64 IN | RESPIRATION RATE: 20 BRPM | HEART RATE: 60 BPM | BODY MASS INDEX: 28.85 KG/M2 | DIASTOLIC BLOOD PRESSURE: 70 MMHG | OXYGEN SATURATION: 99 % | WEIGHT: 169 LBS | SYSTOLIC BLOOD PRESSURE: 110 MMHG | TEMPERATURE: 97.8 F

## 2022-12-08 DIAGNOSIS — H57.89 REDNESS OF LEFT EYE: ICD-10-CM

## 2022-12-08 DIAGNOSIS — E78.2 MIXED HYPERLIPIDEMIA: ICD-10-CM

## 2022-12-08 DIAGNOSIS — Z11.59 NEED FOR HEPATITIS B SCREENING TEST: ICD-10-CM

## 2022-12-08 DIAGNOSIS — Z12.5 SCREENING FOR MALIGNANT NEOPLASM OF PROSTATE: ICD-10-CM

## 2022-12-08 DIAGNOSIS — R73.03 PREDIABETES: ICD-10-CM

## 2022-12-08 DIAGNOSIS — Z00.00 ANNUAL PHYSICAL EXAM: ICD-10-CM

## 2022-12-08 DIAGNOSIS — R53.83 OTHER FATIGUE: ICD-10-CM

## 2022-12-08 DIAGNOSIS — Z12.11 SCREENING FOR COLON CANCER: ICD-10-CM

## 2022-12-08 DIAGNOSIS — D70.8 OTHER NEUTROPENIA (HCC): ICD-10-CM

## 2022-12-08 DIAGNOSIS — Z00.00 ANNUAL PHYSICAL EXAM: Primary | ICD-10-CM

## 2022-12-08 DIAGNOSIS — Z11.4 SCREENING FOR HIV (HUMAN IMMUNODEFICIENCY VIRUS): ICD-10-CM

## 2022-12-08 PROBLEM — R73.9 HYPERGLYCEMIA: Status: RESOLVED | Noted: 2021-06-02 | Resolved: 2022-12-08

## 2022-12-08 PROBLEM — L72.3 SEBACEOUS CYST: Status: RESOLVED | Noted: 2021-06-02 | Resolved: 2022-12-08

## 2022-12-08 LAB
ALBUMIN SERPL BCP-MCNC: 4.4 G/DL (ref 3.5–5)
ALP SERPL-CCNC: 108 U/L (ref 43–122)
ALT SERPL W P-5'-P-CCNC: 26 U/L
ANION GAP SERPL CALCULATED.3IONS-SCNC: 5 MMOL/L (ref 5–14)
AST SERPL W P-5'-P-CCNC: 34 U/L (ref 17–59)
BASOPHILS # BLD AUTO: 0.02 THOUSANDS/ÂΜL (ref 0–0.1)
BASOPHILS NFR BLD AUTO: 1 % (ref 0–1)
BILIRUB SERPL-MCNC: 0.44 MG/DL (ref 0.2–1)
BUN SERPL-MCNC: 13 MG/DL (ref 5–25)
CALCIUM SERPL-MCNC: 9.2 MG/DL (ref 8.4–10.2)
CHLORIDE SERPL-SCNC: 105 MMOL/L (ref 96–108)
CHOLEST SERPL-MCNC: 241 MG/DL
CO2 SERPL-SCNC: 29 MMOL/L (ref 21–32)
CREAT SERPL-MCNC: 0.89 MG/DL (ref 0.7–1.5)
EOSINOPHIL # BLD AUTO: 0.25 THOUSAND/ÂΜL (ref 0–0.61)
EOSINOPHIL NFR BLD AUTO: 6 % (ref 0–6)
ERYTHROCYTE [DISTWIDTH] IN BLOOD BY AUTOMATED COUNT: 13 % (ref 11.6–15.1)
GFR SERPL CREATININE-BSD FRML MDRD: 95 ML/MIN/1.73SQ M
GLUCOSE P FAST SERPL-MCNC: 102 MG/DL (ref 70–99)
HBV SURFACE AB SER-ACNC: 124.97 MIU/ML
HCT VFR BLD AUTO: 44.7 % (ref 36.5–49.3)
HDLC SERPL-MCNC: 54 MG/DL
HGB BLD-MCNC: 15 G/DL (ref 12–17)
IMM GRANULOCYTES # BLD AUTO: 0 THOUSAND/UL (ref 0–0.2)
IMM GRANULOCYTES NFR BLD AUTO: 0 % (ref 0–2)
LDLC SERPL CALC-MCNC: 171 MG/DL
LYMPHOCYTES # BLD AUTO: 2.13 THOUSANDS/ÂΜL (ref 0.6–4.47)
LYMPHOCYTES NFR BLD AUTO: 51 % (ref 14–44)
MCH RBC QN AUTO: 29.5 PG (ref 26.8–34.3)
MCHC RBC AUTO-ENTMCNC: 33.6 G/DL (ref 31.4–37.4)
MCV RBC AUTO: 88 FL (ref 82–98)
MONOCYTES # BLD AUTO: 0.51 THOUSAND/ÂΜL (ref 0.17–1.22)
MONOCYTES NFR BLD AUTO: 12 % (ref 4–12)
NEUTROPHILS # BLD AUTO: 1.23 THOUSANDS/ÂΜL (ref 1.85–7.62)
NEUTS SEG NFR BLD AUTO: 30 % (ref 43–75)
NONHDLC SERPL-MCNC: 187 MG/DL
NRBC BLD AUTO-RTO: 0 /100 WBCS
PLATELET # BLD AUTO: 312 THOUSANDS/UL (ref 149–390)
PMV BLD AUTO: 9.3 FL (ref 8.9–12.7)
POTASSIUM SERPL-SCNC: 4.6 MMOL/L (ref 3.5–5.3)
PROT SERPL-MCNC: 7.8 G/DL (ref 6.4–8.4)
RBC # BLD AUTO: 5.08 MILLION/UL (ref 3.88–5.62)
SODIUM SERPL-SCNC: 139 MMOL/L (ref 135–147)
TRIGL SERPL-MCNC: 82 MG/DL
WBC # BLD AUTO: 4.14 THOUSAND/UL (ref 4.31–10.16)

## 2022-12-08 NOTE — ASSESSMENT & PLAN NOTE
Gained 8 lbs since last visit  Recently returned to gym 2 weeks ago  BMI Counseling: Body mass index is 29 01 kg/m²  The BMI is above normal  Nutrition recommendations include reducing portion sizes and 3-5 servings of fruits/vegetables daily  Exercise recommendations include exercising 3-5 times per week, joining a gym and strength training exercises

## 2022-12-08 NOTE — PROGRESS NOTES
[address] [facility]    NAME: Bryan Bang  AGE: 65 y o  SEX: male  : 1965     DATE: 2022     Assessment and Plan:     Problem List Items Addressed This Visit        Other    Other fatigue     Suspect due to lack of sleep  Sleeps 5-6 hours a night  Discussed sleep hygiene  Mixed hyperlipidemia     Lab Results   Component Value Date    LDLCALC 140 (H) 2022     Low ASCVD Risk score 4 6%  Discussed diet/exercise  Relevant Orders    Lipid panel    Prediabetes     Lab Results   Component Value Date    HGBA1C 5 8 (H) 2022     Patient recently returned to the gym 2 weeks ago  Discussed diet/exercise  Repeat lab  Relevant Orders    Comprehensive metabolic panel    HEMOGLOBIN A1C W/ EAG ESTIMATION    BMI 29 0-29 9,adult     Gained 8 lbs since last visit  Recently returned to gym 2 weeks ago  BMI Counseling: Body mass index is 29 01 kg/m²  The BMI is above normal  Nutrition recommendations include reducing portion sizes and 3-5 servings of fruits/vegetables daily  Exercise recommendations include exercising 3-5 times per week, joining a gym and strength training exercises  Other Visit Diagnoses     Annual physical exam    -  Primary    Relevant Orders    CBC and differential    Screening for HIV (human immunodeficiency virus)        Relevant Orders    HIV 1/2 Antigen/Antibody (4th Generation) w Reflex [facility]    Need for hepatitis B screening test        Relevant Orders    Hepatitis B surface antibody    Screening for colon cancer        Relevant Orders    Cologuard          Immunizations and preventive care screenings were discussed with patient today  Appropriate education was printed on patient's after visit summary  Discussed risks and benefits of prostate cancer screening   We discussed the controversial history of PSA screening for prostate cancer in the [facility] States as well as the risk of over detection and over treatment of prostate cancer by way of PSA screening  The patient understands that PSA blood testing is an imperfect way to screen for prostate cancer and that elevated PSA levels in the blood may also be caused by infection, inflammation, prostatic trauma or manipulation, urological procedures, or by benign prostatic enlargement  The role of the digital rectal examination in prostate cancer screening was also discussed and I discussed with him that there is large interobserver variability in the findings of digital rectal examination  Patient opted for digital rectal exam today because he had sexual relations with his wife last night and would like to do labs today as he is fasting  Declined colonoscopy, opted for cologuard  Counseling:  Alcohol/drug use: discussed moderation in alcohol intake, the recommendations for healthy alcohol use, and avoidance of illicit drug use  Dental Health: discussed importance of regular tooth brushing, flossing, and dental visits  Injury prevention: discussed safety/seat belts, safety helmets, smoke detectors, carbon dioxide detectors, and smoking near bedding or upholstery  · Exercise: the importance of regular exercise/physical activity was discussed  Recommend exercise 3-5 times per week for at least 30 minutes  Return in about 1 year (around 12/8/2023)  Chief Complaint:     Chief Complaint   Patient presents with   • Physical Exam   • Eye Redness      History of Present Illness:     Adult Annual Physical   Patient here for a comprehensive physical exam  The patient reports problems - L eye redness  No change in vision or pain  No discharge  Patient reports it is due to wearing contacts last night  Considering laser eye surgery when he gets vision insurance from his work  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  Lives with wife  Recently returned to gym every other day 2 weeks ago       History was conducted in Maltese without the use of   Diet and Physical Activity  · Diet/Nutrition: well balanced diet  · Exercise: 3-4 times a week on average  Depression Screening  PHQ-2/9 Depression Screening    Little interest or pleasure in doing things: 0 - not at all  Feeling down, depressed, or hopeless: 0 - not at all  PHQ-2 Score: 0  PHQ-2 Interpretation: Negative depression screen       General Health  · Sleep: sleeps poorly and gets 4-6 hours of sleep on average  · Hearing: normal - bilateral   · Vision: wears contacts  · Dental: regular dental visits   Health  · Symptoms include: none     Review of Systems:     Review of Systems   Constitutional: Negative for chills and fever  HENT: Negative for ear pain and sore throat  Eyes: Negative for pain and visual disturbance  Respiratory: Negative for cough and shortness of breath  Cardiovascular: Negative for chest pain and palpitations  Gastrointestinal: Negative for abdominal pain and vomiting  Genitourinary: Negative for dysuria and hematuria  Musculoskeletal: Negative for arthralgias and back pain  Skin: Negative for color change and rash  Neurological: Negative for seizures and syncope  All other systems reviewed and are negative       Past Medical History:     Past Medical History:   Diagnosis Date   • High cholesterol    • Pre-diabetes       Past Surgical History:     Past Surgical History:   Procedure Laterality Date   • CYST REMOVAL     • MI EXC TUMOR SOFT TISSUE NECK/ANT THORAX SUBQ <3CM N/A 8/4/2021    Procedure: EXCISION OF ENLARGING NECK & BACK MASSES;  Surgeon: Harrold Buerger, DO;  Location: [address] [place] [address];  Service: General      Family History:     Family History   Problem Relation Age of Onset   • Diabetes Father       Social History:     Social History     Socioeconomic History   • Marital status: /Civil Union     Spouse name: None   • Number of children: None   • Years of education: None   • Highest education level: None   Occupational History   • None   Tobacco Use   • Smoking status: Never   • Smokeless tobacco: Never   Vaping Use   • Vaping Use: Never used   Substance and Sexual Activity   • Alcohol use: Not Currently   • Drug use: Never   • Sexual activity: Yes     Partners: Female     Birth control/protection: None   Other Topics Concern   • None   Social History Narrative   • None     Social Determinants of Health     Financial Resource Strain: Not on file   Food Insecurity: Not on file   Transportation Needs: Not on file   Physical Activity: Not on file   Stress: Not on file   Social Connections: Not on file   Intimate Partner Violence: Not on file   Housing Stability: Not on file      Current Medications:     No current outpatient medications on file  No current facility-administered medications for this visit  Allergies:     No Known Allergies   Physical Exam:     /70 (BP Location: Left arm, Patient Position: Sitting, Cuff Size: Standard)   Pulse 60   Temp 97 8 °F (36 6 °C) (Temporal)   Resp 20   Ht 5' 4" (1 626 m)   Wt 76 7 kg (169 lb)   SpO2 99%   BMI 29 01 kg/m²     Physical Exam  Vitals and nursing note reviewed  Chaperone present: patient declined  Constitutional:       General: He is not in acute distress  Appearance: He is well-developed  HENT:      Head: Normocephalic and atraumatic  Right Ear: There is impacted cerumen  Left Ear: Tympanic membrane, ear canal and external ear normal    Eyes:      General: Vision grossly intact  Conjunctiva/sclera:      Left eye: Left conjunctiva is not injected  Hemorrhage present  No chemosis or exudate  Cardiovascular:      Rate and Rhythm: Normal rate and regular rhythm  Heart sounds: No murmur heard  Pulmonary:      Effort: Pulmonary effort is normal  No respiratory distress  Breath sounds: Normal breath sounds  Abdominal:      Palpations: Abdomen is soft  Tenderness:  There is no abdominal tenderness  Genitourinary:     Prostate: Not enlarged, not tender and no nodules present  Rectum: No external hemorrhoid  Musculoskeletal:         General: No swelling  Cervical back: Neck supple  Skin:     General: Skin is warm and dry  Capillary Refill: Capillary refill takes less than 2 seconds  Neurological:      Mental Status: He is alert     Psychiatric:         Mood and Affect: Mood normal           Estrella Molina PA-C  [address]

## 2022-12-08 NOTE — ASSESSMENT & PLAN NOTE
Lab Results   Component Value Date    LDLCALC 140 (H) 01/25/2022     Low ASCVD Risk score 4 6%  Discussed diet/exercise

## 2022-12-08 NOTE — ASSESSMENT & PLAN NOTE
Lab Results   Component Value Date    HGBA1C 5 8 (H) 01/25/2022     Patient recently returned to the gym 2 weeks ago  Discussed diet/exercise  Repeat lab

## 2022-12-09 LAB — HIV 1+2 AB+HIV1 P24 AG SERPL QL IA: NORMAL

## 2022-12-10 LAB
EST. AVERAGE GLUCOSE BLD GHB EST-MCNC: 120 MG/DL
HBA1C MFR BLD: 5.8 %

## 2022-12-12 PROBLEM — D70.8 OTHER NEUTROPENIA (HCC): Status: ACTIVE | Noted: 2022-12-12

## 2023-01-04 LAB — COLOGUARD RESULT REPORTABLE: NEGATIVE

## 2024-12-30 NOTE — TELEPHONE ENCOUNTER
----- Message from Deb Mock Gabbie  sent at 7/23/2021  8:38 AM EDT -----  Please call patient and inform that lab results  show prediabetes and slightly elevated cholesterol  He is not yet diabetic but he should eat foods higher in fiber such as vegetables, fruits, whole grains and foods rich in omega-3 fatty acids such as fatty fish, nuts, and seeds but avoid consumption of sugar-sweetened beverages  Diet and exercise with weight loss as well  Explain to patient that we would monitor in 6 months for follow up  

## (undated) DEVICE — SUT VICRYL 3-0 SH 27 IN J416H

## (undated) DEVICE — SYRINGE 10ML LL CONTROL TOP

## (undated) DEVICE — LIGHT GLOVE GREEN

## (undated) DEVICE — 1820 FOAM BLOCK NEEDLE COUNTER: Brand: DEVON

## (undated) DEVICE — SPONGE 4 X 4 XRAY 16 PLY STRL LF RFD

## (undated) DEVICE — OCCLUSIVE GAUZE STRIP,3% BISMUTH TRIBROMOPHENATE IN PETROLATUM BLEND: Brand: XEROFORM

## (undated) DEVICE — GLOVE SRG LF STRL BGL SKNSNS 7 PF

## (undated) DEVICE — SYRINGE 30ML LL

## (undated) DEVICE — NEEDLE BLUNT 18 G X 1 1/2IN

## (undated) DEVICE — GAUZE SPONGES,16 PLY: Brand: CURITY

## (undated) DEVICE — NEEDLE 25G X 1 1/2

## (undated) DEVICE — SUT ETHILON 3-0 FSLX 30 IN 1673H

## (undated) DEVICE — SCD SEQUENTIAL COMPRESSION COMFORT SLEEVE MEDIUM KNEE LENGTH: Brand: KENDALL SCD

## (undated) DEVICE — SKIN MARKER DUAL TIP WITH RULER CAP, FLEXIBLE RULER AND LABELS: Brand: DEVON

## (undated) DEVICE — PENCIL ELECTROSURG E-Z CLEAN -0035H

## (undated) DEVICE — SUT PDS II 2-0 CT-1 27 IN Z339H

## (undated) DEVICE — POOLE SUCTION INSTRUMENT WITH REMOVABLE SHEATH AND PREATTACHED 6 FT. (1.8M) CLEAR PLASTIC TUBING: Brand: POOLE

## (undated) DEVICE — PROXIMATE PLUS MD MULTI-DIRECTIONAL RELEASE SKIN STAPLERS CONTAINS 35 STAINLESS STEEL STAPLES APPROXIMATE CLOSED DIMENSIONS: 6.9MM X 3.9MM WIDE: Brand: PROXIMATE

## (undated) DEVICE — INTENDED FOR TISSUE SEPARATION, AND OTHER PROCEDURES THAT REQUIRE A SHARP SURGICAL BLADE TO PUNCTURE OR CUT.: Brand: BARD-PARKER SAFETY BLADES SIZE 15, STERILE